# Patient Record
Sex: FEMALE | Race: WHITE | URBAN - METROPOLITAN AREA
[De-identification: names, ages, dates, MRNs, and addresses within clinical notes are randomized per-mention and may not be internally consistent; named-entity substitution may affect disease eponyms.]

---

## 2017-06-20 ENCOUNTER — OFFICE VISIT (OUTPATIENT)
Dept: INTERNAL MEDICINE CLINIC | Facility: HOSPITAL | Age: 22
End: 2017-06-20
Attending: EMERGENCY MEDICINE

## 2017-06-20 DIAGNOSIS — R76.11 POSITIVE TB TEST: Primary | ICD-10-CM

## 2017-06-20 PROCEDURE — 86480 TB TEST CELL IMMUN MEASURE: CPT

## 2025-05-13 NOTE — ED QUICK NOTES
DR GOLD WITH READ ON PT'S CT SCAN, POSSIBLE CHOLECYSTITIS.  DR JOY NOTIFIED , CHECKED PT'S RECORDS, SPOKE WITH DR PICKARD WHO WAS COMFORTABLE WITH PT FOLLOWING UP WITH SURGERY THIS WEEK PER [facility].  PT WAS PAINFREE ON DISCHARGE.

## 2025-05-13 NOTE — ED INITIAL ASSESSMENT (HPI)
Patient reports generalized abd pain that radiates to the right flank for two hours with one episode of emesis.

## 2025-05-13 NOTE — ED PROVIDER NOTES
Patient Seen in: [facility] Emergency Department       History     Chief Complaint   Patient presents with    Abdomen/Flank Pain     Stated Complaint: abd pain x 2 hours. received fentanyl and zofran en route    Subjective:   HPI  History of Present Illness            29-year-old female presenting with epigastric right upper quadrant pain and back pain that came on around 9:00 this evening patient was given pain medication around but since being given IV fluids and patient's pain is starting to improve.  She did vomit turning her up.  She denies recent illness cough cold runny nose she denies any other exacerbating relieving factors or associated symptoms      Objective:     History reviewed. No pertinent past medical history.           History reviewed. No pertinent surgical history.             Social History     Socioeconomic History    Marital status:    Tobacco Use    Smoking status: Never    Smokeless tobacco: Never   Substance and Sexual Activity    Alcohol use: Never    Drug use: Never                                Physical Exam     ED Triage Vitals [05/12/25 2313]   BP 96/61   Pulse 88   Resp 18   Temp 99 °F (37.2 °C)   Temp src    SpO2 96 %   O2 Device None (Room air)       Current Vitals:   Vital Signs  BP: 106/67  Pulse: 88  Resp: 18  Temp: 99 °F (37.2 °C)    Oxygen Therapy  SpO2: 99 %  O2 Device: None (Room air)          Physical Exam     Awake alert patient appears in no distress HEENT exam, lungs are clear cardiovascular exam regular rhythm abdomen soft nontender extremities no Cyanosis or edema no rash back exam is normal skin is nondiaphoretic no focal neurologic deficits      ED Course     Labs Reviewed   COMP METABOLIC PANEL (14) - Abnormal; Notable for the following components:       Result Value     (*)      (*)     All other components within normal limits   CBC WITH DIFFERENTIAL WITH PLATELET - Abnormal; Notable for the following components:    WBC 12.2 (*)      Neutrophil Absolute Prelim 10.32 (*)     Neutrophil Absolute 10.32 (*)     All other components within normal limits   URINALYSIS WITH CULTURE REFLEX - Abnormal; Notable for the following components:    Spec Gravity >1.030 (*)     Protein Urine 30 (*)     Urobilinogen Urine 6 (*)     Squamous Epi. Cells Few (*)     All other components within normal limits   LIPASE - Normal   POCT PREGNANCY URINE - Normal       ED Course as of 05/13/25 0311  ------------------------------------------------------------  Time: 05/13 0158  Value: CBC With Differential With Platelet(!)  Comment: Slightly elevated white cell count  ------------------------------------------------------------  Time: 05/13 0158  Value: Comp Metabolic Panel (14)(!)  Comment: Elevated liver enzymes  ------------------------------------------------------------  Time: 05/13 0158  Value: POCT Pregnancy, Urine  Comment: Unremarkable  ------------------------------------------------------------  Time: 05/13 0159  Value: Urinalysis with Culture Reflex(!)  Comment: Unremarkable     Results            Differential diagnosis includes cholecystitis perforated viscus                  MDM              Medical Decision Making  29-year-old female presenting with abdominal pain IV established cardiac monitor shows essentially pulse ox with no signs of hypoxia CBC shows elevated white cell count metabolic panel elevated liver enzymes independent location by ED physician of CT scan of abdomen pelvis shows a congested gallbladder.  Patient's pain is continue to prepare emerged part serial abdominal exams and patient soft nonsurgical patient will be discharged home to follow-up with general surgery I did discuss with her still the potential risk of hepatitis as well she is return to the emergency department for worsening symptoms or complaints  The patient was screened and evaluated during this visit.  As a treating physician attending to the patient, I determined, within  reasonable clinical confidence and prior to discharge, that an emergency medical condition was not or was no longer present.  There was no indication for further evaluation, treatment or admission on an emergency basis.    The usual and customary discharge instructions were discussed given the patient's ER course.  We discussed signs and symptoms that should prompt the patient's immediate return to the emergency department.  Reasonable over-the-counter and prescription treatment options and physician follow-up plan was discussed.  Patient was discharged home in good condition  This note was prepared using Dragon Medical voice recognition dictation software.  As a result errors may occur.  When identified to these areas have been corrected.  While every attempt is made to correct errors during dictation discrepancies may still exist.  Please contact if there are any errors    Problems Addressed:  Elevated liver enzymes: acute illness or injury  RUQ pain: acute illness or injury    Amount and/or Complexity of Data Reviewed  Labs: ordered. Decision-making details documented in ED Course.  Radiology: ordered and independent interpretation performed. Decision-making details documented in ED Course.  ECG/medicine tests: ordered and independent interpretation performed. Decision-making details documented in ED Course.        Disposition and Plan     Clinical Impression:  1. RUQ pain    2. Elevated liver enzymes         Disposition:  Discharge  5/13/2025  3:11 am    Follow-up:  Emir Godoman MD  [address] [facility] [address] [phone]    Follow up in 1 week(s)            Medications Prescribed:  Current Discharge Medication List                Supplementary Documentation: